# Patient Record
Sex: MALE | Race: WHITE | Employment: FULL TIME | URBAN - METROPOLITAN AREA
[De-identification: names, ages, dates, MRNs, and addresses within clinical notes are randomized per-mention and may not be internally consistent; named-entity substitution may affect disease eponyms.]

---

## 2022-08-17 ENCOUNTER — HOSPITAL ENCOUNTER (EMERGENCY)
Dept: CT IMAGING | Age: 42
Discharge: HOME | End: 2022-08-20
Payer: OTHER GOVERNMENT

## 2022-08-17 ENCOUNTER — HOSPITAL ENCOUNTER (EMERGENCY)
Dept: GENERAL RADIOLOGY | Age: 42
Discharge: HOME | End: 2022-08-20
Payer: OTHER GOVERNMENT

## 2022-08-17 ENCOUNTER — HOSPITAL ENCOUNTER (EMERGENCY)
Age: 42
Discharge: HOME | End: 2022-08-17
Attending: EMERGENCY MEDICINE
Payer: OTHER GOVERNMENT

## 2022-08-17 VITALS
HEIGHT: 69 IN | SYSTOLIC BLOOD PRESSURE: 118 MMHG | OXYGEN SATURATION: 100 % | RESPIRATION RATE: 14 BRPM | TEMPERATURE: 98.5 F | HEART RATE: 58 BPM | BODY MASS INDEX: 16.29 KG/M2 | DIASTOLIC BLOOD PRESSURE: 70 MMHG | WEIGHT: 110 LBS

## 2022-08-17 DIAGNOSIS — R51.9 NONINTRACTABLE HEADACHE, UNSPECIFIED CHRONICITY PATTERN, UNSPECIFIED HEADACHE TYPE: ICD-10-CM

## 2022-08-17 DIAGNOSIS — E86.0 DEHYDRATION: Primary | ICD-10-CM

## 2022-08-17 DIAGNOSIS — R55 SYNCOPE AND COLLAPSE: ICD-10-CM

## 2022-08-17 DIAGNOSIS — R39.11 URINARY HESITANCY: ICD-10-CM

## 2022-08-17 DIAGNOSIS — E87.6 HYPOKALEMIA: ICD-10-CM

## 2022-08-17 LAB
ALBUMIN SERPL-MCNC: 4.2 G/DL (ref 3.5–5)
ALBUMIN/GLOB SERPL: 1.2 {RATIO} (ref 1.2–3.5)
ALP SERPL-CCNC: 67 U/L (ref 50–136)
ALT SERPL-CCNC: 20 U/L (ref 12–65)
ANION GAP SERPL CALC-SCNC: 4 MMOL/L (ref 7–16)
AST SERPL-CCNC: 21 U/L (ref 15–37)
BASOPHILS # BLD: 0.1 K/UL (ref 0–0.2)
BASOPHILS NFR BLD: 1 % (ref 0–2)
BILIRUB SERPL-MCNC: 0.6 MG/DL (ref 0.2–1.1)
BUN SERPL-MCNC: 15 MG/DL (ref 6–23)
CALCIUM SERPL-MCNC: 9.7 MG/DL (ref 8.3–10.4)
CHLORIDE SERPL-SCNC: 101 MMOL/L (ref 98–107)
CO2 SERPL-SCNC: 34 MMOL/L (ref 21–32)
CREAT SERPL-MCNC: 0.74 MG/DL (ref 0.8–1.5)
DIFFERENTIAL METHOD BLD: ABNORMAL
EKG ATRIAL RATE: 90 BPM
EKG DIAGNOSIS: NORMAL
EKG P AXIS: 78 DEGREES
EKG P-R INTERVAL: 176 MS
EKG Q-T INTERVAL: 356 MS
EKG QRS DURATION: 108 MS
EKG QTC CALCULATION (BAZETT): 435 MS
EKG R AXIS: 92 DEGREES
EKG T AXIS: 71 DEGREES
EKG VENTRICULAR RATE: 90 BPM
EOSINOPHIL # BLD: 0.1 K/UL (ref 0–0.8)
EOSINOPHIL NFR BLD: 2 % (ref 0.5–7.8)
ERYTHROCYTE [DISTWIDTH] IN BLOOD BY AUTOMATED COUNT: 12.3 % (ref 11.9–14.6)
GLOBULIN SER CALC-MCNC: 3.5 G/DL (ref 2.3–3.5)
GLUCOSE SERPL-MCNC: 80 MG/DL (ref 65–100)
HCT VFR BLD AUTO: 38.9 % (ref 41.1–50.3)
HGB BLD-MCNC: 12.8 G/DL (ref 13.6–17.2)
IMM GRANULOCYTES # BLD AUTO: 0 K/UL (ref 0–0.5)
IMM GRANULOCYTES NFR BLD AUTO: 0 % (ref 0–5)
LYMPHOCYTES # BLD: 1.2 K/UL (ref 0.5–4.6)
LYMPHOCYTES NFR BLD: 26 % (ref 13–44)
MAGNESIUM SERPL-MCNC: 1.7 MG/DL (ref 1.8–2.4)
MCH RBC QN AUTO: 29.3 PG (ref 26.1–32.9)
MCHC RBC AUTO-ENTMCNC: 32.9 G/DL (ref 31.4–35)
MCV RBC AUTO: 89 FL (ref 79.6–97.8)
MONOCYTES # BLD: 0.3 K/UL (ref 0.1–1.3)
MONOCYTES NFR BLD: 6 % (ref 4–12)
NEUTS SEG # BLD: 3.2 K/UL (ref 1.7–8.2)
NEUTS SEG NFR BLD: 66 % (ref 43–78)
NRBC # BLD: 0 K/UL (ref 0–0.2)
PLATELET # BLD AUTO: 196 K/UL (ref 150–450)
PMV BLD AUTO: 10.3 FL (ref 9.4–12.3)
POTASSIUM SERPL-SCNC: 3.4 MMOL/L (ref 3.5–5.1)
PROT SERPL-MCNC: 7.7 G/DL (ref 6.3–8.2)
RBC # BLD AUTO: 4.37 M/UL (ref 4.23–5.6)
SODIUM SERPL-SCNC: 139 MMOL/L (ref 136–145)
WBC # BLD AUTO: 4.8 K/UL (ref 4.3–11.1)

## 2022-08-17 PROCEDURE — 93005 ELECTROCARDIOGRAM TRACING: CPT | Performed by: EMERGENCY MEDICINE

## 2022-08-17 PROCEDURE — 71045 X-RAY EXAM CHEST 1 VIEW: CPT

## 2022-08-17 PROCEDURE — 83735 ASSAY OF MAGNESIUM: CPT

## 2022-08-17 PROCEDURE — 85025 COMPLETE CBC W/AUTO DIFF WBC: CPT

## 2022-08-17 PROCEDURE — 99285 EMERGENCY DEPT VISIT HI MDM: CPT

## 2022-08-17 PROCEDURE — 2580000003 HC RX 258: Performed by: NURSE PRACTITIONER

## 2022-08-17 PROCEDURE — 96375 TX/PRO/DX INJ NEW DRUG ADDON: CPT

## 2022-08-17 PROCEDURE — 96374 THER/PROPH/DIAG INJ IV PUSH: CPT

## 2022-08-17 PROCEDURE — 94762 N-INVAS EAR/PLS OXIMTRY CONT: CPT

## 2022-08-17 PROCEDURE — 80053 COMPREHEN METABOLIC PANEL: CPT

## 2022-08-17 PROCEDURE — 6360000002 HC RX W HCPCS: Performed by: NURSE PRACTITIONER

## 2022-08-17 PROCEDURE — 70450 CT HEAD/BRAIN W/O DYE: CPT

## 2022-08-17 PROCEDURE — 6370000000 HC RX 637 (ALT 250 FOR IP): Performed by: NURSE PRACTITIONER

## 2022-08-17 PROCEDURE — 72125 CT NECK SPINE W/O DYE: CPT

## 2022-08-17 RX ORDER — FENTANYL 25 UG/H
1 PATCH TRANSDERMAL
COMMUNITY

## 2022-08-17 RX ORDER — ALPRAZOLAM 1 MG/1
1 TABLET ORAL 3 TIMES DAILY PRN
COMMUNITY

## 2022-08-17 RX ORDER — CETIRIZINE HYDROCHLORIDE 10 MG/1
10 TABLET ORAL DAILY
COMMUNITY

## 2022-08-17 RX ORDER — DIPHENHYDRAMINE HYDROCHLORIDE 50 MG/ML
50 INJECTION INTRAMUSCULAR; INTRAVENOUS
Status: COMPLETED | OUTPATIENT
Start: 2022-08-17 | End: 2022-08-17

## 2022-08-17 RX ORDER — OXYCODONE AND ACETAMINOPHEN 7.5; 325 MG/1; MG/1
1 TABLET ORAL EVERY 6 HOURS PRN
COMMUNITY

## 2022-08-17 RX ORDER — DULOXETIN HYDROCHLORIDE 60 MG/1
90 CAPSULE, DELAYED RELEASE ORAL DAILY
COMMUNITY

## 2022-08-17 RX ORDER — POTASSIUM CHLORIDE 1.5 G/1.77G
20 POWDER, FOR SOLUTION ORAL 2 TIMES DAILY
COMMUNITY

## 2022-08-17 RX ORDER — CYPROHEPTADINE HYDROCHLORIDE 4 MG/1
4 TABLET ORAL
COMMUNITY

## 2022-08-17 RX ORDER — LANOLIN ALCOHOL/MO/W.PET/CERES
400 CREAM (GRAM) TOPICAL
Status: COMPLETED | OUTPATIENT
Start: 2022-08-17 | End: 2022-08-17

## 2022-08-17 RX ORDER — CYCLOBENZAPRINE HCL 10 MG
10 TABLET ORAL 2 TIMES DAILY PRN
COMMUNITY

## 2022-08-17 RX ORDER — FOLIC ACID 1 MG/1
1 TABLET ORAL DAILY
COMMUNITY

## 2022-08-17 RX ORDER — 0.9 % SODIUM CHLORIDE 0.9 %
2000 INTRAVENOUS SOLUTION INTRAVENOUS ONCE
Status: COMPLETED | OUTPATIENT
Start: 2022-08-17 | End: 2022-08-17

## 2022-08-17 RX ORDER — SUMATRIPTAN 100 MG/1
100 TABLET, FILM COATED ORAL 2 TIMES DAILY
COMMUNITY

## 2022-08-17 RX ORDER — GABAPENTIN 400 MG/1
300 CAPSULE ORAL 3 TIMES DAILY
COMMUNITY

## 2022-08-17 RX ORDER — PANTOPRAZOLE SODIUM 20 MG/1
20 TABLET, DELAYED RELEASE ORAL DAILY
COMMUNITY

## 2022-08-17 RX ORDER — TRAZODONE HYDROCHLORIDE 100 MG/1
150 TABLET ORAL NIGHTLY
COMMUNITY

## 2022-08-17 RX ORDER — SILDENAFIL 100 MG/1
100 TABLET, FILM COATED ORAL DAILY
COMMUNITY

## 2022-08-17 RX ORDER — FLUDROCORTISONE ACETATE 0.1 MG/1
0.1 TABLET ORAL DAILY
COMMUNITY

## 2022-08-17 RX ORDER — DIPHENOXYLATE HYDROCHLORIDE AND ATROPINE SULFATE 2.5; .025 MG/1; MG/1
1 TABLET ORAL 4 TIMES DAILY PRN
COMMUNITY

## 2022-08-17 RX ORDER — POTASSIUM CHLORIDE 20 MEQ/1
40 TABLET, EXTENDED RELEASE ORAL 2 TIMES DAILY WITH MEALS
Status: DISCONTINUED | OUTPATIENT
Start: 2022-08-17 | End: 2022-08-17 | Stop reason: HOSPADM

## 2022-08-17 RX ORDER — KETOROLAC TROMETHAMINE 15 MG/ML
15 INJECTION, SOLUTION INTRAMUSCULAR; INTRAVENOUS
Status: COMPLETED | OUTPATIENT
Start: 2022-08-17 | End: 2022-08-17

## 2022-08-17 RX ORDER — DEXAMETHASONE SODIUM PHOSPHATE 10 MG/ML
10 INJECTION INTRAMUSCULAR; INTRAVENOUS
Status: COMPLETED | OUTPATIENT
Start: 2022-08-17 | End: 2022-08-17

## 2022-08-17 RX ORDER — DICYCLOMINE HYDROCHLORIDE 10 MG/1
10 CAPSULE ORAL
COMMUNITY

## 2022-08-17 RX ORDER — LOPERAMIDE HYDROCHLORIDE 2 MG/1
2 CAPSULE ORAL 4 TIMES DAILY PRN
COMMUNITY

## 2022-08-17 RX ADMIN — POTASSIUM CHLORIDE 40 MEQ: 1500 TABLET, EXTENDED RELEASE ORAL at 18:06

## 2022-08-17 RX ADMIN — DEXAMETHASONE SODIUM PHOSPHATE 10 MG: 10 INJECTION INTRAMUSCULAR; INTRAVENOUS at 15:23

## 2022-08-17 RX ADMIN — Medication 400 MG: at 18:06

## 2022-08-17 RX ADMIN — KETOROLAC TROMETHAMINE 15 MG: 15 INJECTION, SOLUTION INTRAMUSCULAR; INTRAVENOUS at 15:23

## 2022-08-17 RX ADMIN — SODIUM CHLORIDE 2000 ML: 900 INJECTION, SOLUTION INTRAVENOUS at 15:24

## 2022-08-17 RX ADMIN — DIPHENHYDRAMINE HYDROCHLORIDE 50 MG: 50 INJECTION INTRAMUSCULAR; INTRAVENOUS at 15:23

## 2022-08-17 ASSESSMENT — PAIN DESCRIPTION - LOCATION
LOCATION: BACK
LOCATION: HEAD

## 2022-08-17 ASSESSMENT — PAIN - FUNCTIONAL ASSESSMENT
PAIN_FUNCTIONAL_ASSESSMENT: 0-10
PAIN_FUNCTIONAL_ASSESSMENT: NONE - DENIES PAIN

## 2022-08-17 ASSESSMENT — PAIN SCALES - GENERAL: PAINLEVEL_OUTOF10: 7

## 2022-08-17 ASSESSMENT — PAIN DESCRIPTION - PAIN TYPE: TYPE: CHRONIC PAIN

## 2022-08-17 NOTE — ED PROVIDER NOTES
[facility] Emergency Department Provider Note                   PCP:                None Provider               Age: 38 y.o. Sex: male       ICD-10-CM    1. Dehydration  E86.0       2. Syncope and collapse  R55 [id number]      3. Hypokalemia  E87.6       4. Urinary hesitancy  R39.11       5. Nonintractable headache, unspecified chronicity pattern, unspecified headache type  R51.9           DISPOSITION         MDM  Number of Diagnoses or Management Options  Dehydration: new, needed workup  Hypokalemia: new, needed workup  Nonintractable headache, unspecified chronicity pattern, unspecified headache type: new, needed workup  Syncope and collapse: new, needed workup  Urinary hesitancy: new, needed workup     Amount and/or Complexity of Data Reviewed  Clinical lab tests: ordered and reviewed  Tests in the radiology section of CPT®: ordered and reviewed  Tests in the medicine section of CPT®: reviewed and ordered  Discussion of test results with the performing providers: yes  Decide to obtain previous medical records or to obtain history from someone other than the patient: yes  Obtain history from someone other than the patient: yes  Review and summarize past medical records: yes  Discuss the patient with other providers: yes  Independent visualization of images, tracings, or specimens: yes    Risk of Complications, Morbidity, and/or Mortality  Presenting problems: high  Diagnostic procedures: high  Management options: high    Patient Progress  Patient progress: improved    As in HPI. Patient is pleasant well-appearing, A&Ox4. He is a reassuring neuro exam with no focal deficits, cranial nerves grossly intact. States he has a \"mild headache. \"  Has had headache daily for the last week history of migraines. Fevers or chills. No chest pain, no visual change. States he has had similar episodes of syncope in the past.  Reports history of \"orthostatic syncope. \"  Followed with neurologist remotely. States he called the [facility] and advised to come to the ED. Patient states that he feels he may be a bit dehydrated. Suspect this may be exacerbating his orthostatic hypotension. Soft blood pressure on arrival, improved after IV fluids. Prior to giving fluids, is not quite orthostatic. Labs were reassuring, as well as UA. Patient later reported that for the last \"few years,\" he feels as if he has difficulty fully emptying his bladder, denies any new or worsening symptoms with this. No flank or CVA tenderness, no abdominal tenderness. Normoactive bowel sounds. Abdomen soft, no guarding rebound or rigidity. There is no saddle anesthesia and denies rectal sphincter laxity. No difficulty emptying his bladder in the ED, reassuring postvoid residual.  Reassuring EKG and chest x-ray, CT scan of the head and cervical spine were obtained with no acute emergent process noted. Slightly low magnesium and potassium was repleted in the ED. Discussed with ED attending, feel he stable for discharge home. Provided neurology referral, contact number to follow-up with urology advised to call next 1-2 days. Follow-up with PCP in 1 to 2 days. Given strict return precautions. Return to the ED for new, worsening, concerning symptoms. Patient is well-hydrated appearing, no distress. Nontoxic-appearing, tolerating oral intake, hemodynamically stable. All findings and plan were discussed with the patient. All questions answered. Discussed with the patient that an unremarkable evaluation in the ED does not preclude the development or presence of a serious or life threatening condition. Patient was instructed to return immediately for any worsening or change in current symptoms, or if symptoms do not continue to improve.  I instructed them to follow up with their primary care provider, own specialist, or medical provider that I am recommending for him within the next 2-3 days  The patient acknowledged understanding plan of care and affirmed approval.     Signed by: THERESE Lau     This note created using Dragon voice recognition software. Please excuse any accidental errors associated with its use, as note has not been fully proofread and edited. Orders Placed This Encounter   Procedures    XR CHEST PORTABLE    CT HEAD WO CONTRAST    CT CERVICAL SPINE WO CONTRAST    CBC with Auto Differential    Comprehensive Metabolic Panel    Magnesium    Cardiac Monitor - ED Only    Continuous Pulse Oximetry    Orthostatic blood pressure and pulse    Orthostatic blood pressure and pulse    Measure post void residual    POCT Urinalysis no Micro    EKG 12 Lead    Saline lock IV        Emelyn Gates is a [address] y.o. male who presents to the Emergency Department with chief complaint of    Chief Complaint   Patient presents with    Loss of Consciousness    Fatigue      HPI  Pleasant 51-year-old male presents to the emergency department with complaint of frequent episodes of lightheadedness and syncope. States that symptoms have been occurring daily for the last 5-7 days. States that he will feel in his normal state of health, will stand up and began walking at which point time he states he \"blacks out. \"  Reports a history of of orthostatic syncope and states he has had multiple similar episodes in years past.  States he has had intermittent headache for the last month. States that headache resolved after taking ibuprofen or Tylenol, headache is most often occurring at nighttime, but sometimes not daily. Reports history of migraines. States that his headache symptoms at this time are consistent with those which he typically experiences. He denies thunderclap onset of symptoms and reports gradual onset and increase in severity. Denies history of seizure disorder, brain CA, stroke, hypertension.   Denies injury, pain, visual change, neck or back pain, diarrhea/tightness weakness, urinary complaint, chest pain, other prodrome. Alert and oriented x4, hemodynamic stable, afebrile. Appears no acute distress, answers questions appropriately. Not toxic appearing. History of migraines, TBI, IBS, PTSD, avascular necrosis, prior colon cancer and colostomy. Denies any known cancer. Denies night sweats, recent weight loss, abdominal pain, other complaint. All other systems reviewed and are negative. Review of Systems  Constitutional: Negative for fever. Negative for appetite change, chills, diaphoresis and unexpected weight change. HENT: Negative     Eyes: Negative   Respiratory: Negative  Cardiovascular: HPI musculoskeletal: Negative   Skin: Negative     Allergic/Immunologic: Negative  Neurological: In HPI                      Past Medical History:   Diagnosis Date    Arthritis     Avascular necrosis ([facility]     Colostomy in place [facility])     COPD (chronic obstructive pulmonary disease) ([facility]     Crohn's disease (HCC)     Depression     GERD (gastroesophageal reflux disease)     Headache     IBD (inflammatory bowel disease)     IBS (irritable bowel syndrome)     PTSD (post-traumatic stress disorder)     Radiation colitis     TBI (traumatic brain injury) ([facility] 2004        History reviewed. No pertinent surgical history. History reviewed. No pertinent family history. Social History     Socioeconomic History    Marital status:      Spouse name: None    Number of children: None    Years of education: None    Highest education level: None   Tobacco Use    Smoking status: Never    Smokeless tobacco: Never   Substance and Sexual Activity    Alcohol use: Yes    Drug use: Never        Allergies: Compazine [prochlorperazine], Reglan [metoclopramide], and Zofran [ondansetron]    Previous Medications    ALPRAZOLAM (XANAX) 1 MG TABLET    Take 1 mg by mouth 3 times daily as needed for Sleep.     CETIRIZINE (ZYRTEC) 10 MG TABLET    Take 10 mg by mouth daily    CYCLOBENZAPRINE (FLEXERIL) 10 MG TABLET    Take 10 mg by mouth 2 times daily as needed for Muscle spasms    CYPROHEPTADINE (PERIACTIN) 4 MG TABLET    Take 4 mg by mouth    DICYCLOMINE (BENTYL) 10 MG CAPSULE    Take 10 mg by mouth 4 times daily (before meals and nightly)    DIPHENOXYLATE-ATROPINE (LOMOTIL) 2.5-0.025 MG PER TABLET    Take 1 tablet by mouth 4 times daily as needed for Diarrhea. DULOXETINE (CYMBALTA) 60 MG EXTENDED RELEASE CAPSULE    Take 90 mg by mouth daily    FENTANYL (DURAGESIC) 25 MCG/HR    Place 1 patch onto the skin every 72 hours. FLUDROCORTISONE (FLORINEF) 0.1 MG TABLET    Take 0.1 mg by mouth daily    FOLIC ACID (FOLVITE) 1 MG TABLET    Take 1 mg by mouth daily    GABAPENTIN (NEURONTIN) 400 MG CAPSULE    Take 300 mg by mouth 3 times daily. LOPERAMIDE (IMODIUM) 2 MG CAPSULE    Take 2 mg by mouth 4 times daily as needed for Diarrhea    OXYCODONE-ACETAMINOPHEN (PERCOCET) 7.5-325 MG PER TABLET    Take 1 tablet by mouth every 6 hours as needed for Pain. PANTOPRAZOLE (PROTONIX) 20 MG TABLET    Take 20 mg by mouth daily    POTASSIUM CHLORIDE (KLOR-CON) 20 MEQ PACKET    Take 20 mEq by mouth 2 times daily    SILDENAFIL (VIAGRA) 100 MG TABLET    Take 100 mg by mouth daily    SUMATRIPTAN (IMITREX) 100 MG TABLET    Take 100 mg by mouth in the morning and at bedtime    TRAZODONE (DESYREL) 100 MG TABLET    Take 150 mg by mouth nightly        Vitals signs and nursing note reviewed. Patient Vitals for the past 4 hrs:   Temp Pulse Resp BP SpO2   08/17/22 1354 98.5 °F (36.9 °C) 94 16 103/67 99 %          Physical Exam   Constitutional: Oriented to person, place, and time. Appears well-developed and well-nourished. No distress. HENT:    Head: Normocephalic and atraumatic   Right Ear: External ear normal.    Left Ear: External ear normal.     Nose: Nose normal.   Mouth/Throat: Mouth normal.    Eyes: Conjunctivae are normal.   Neck: Supple. No tracheal deviation. Cardiovascular: Normal rate, intact distal pulses.  Brisk capillary refill intact, less than 2 seconds. Regular rhythm present. No pitting edema. Pulmonary/Chest: Lungs are clear & equal bilaterally. No adventitious sounds. No respiratory distress. Abdominal: Soft. There is no tenderness, tension, guarding, rebound, rigidity. .    Musculoskeletal: No obvious deformity, erythema, edema. Neurological: Alert and oriented to person, place, and time. No numbness/tingling. No loss of sensation. Positive PMS ×4. GCS= 15. No focal deficits. Cranial nerves grossly intact. Skin: Skin is warm and dry. Capillary refill takes less than 2 seconds. No abrasion, no lesion, no petechiae and no rash noted. Not diaphoretic. No cyanosis, erythema, or pallor. Psychiatric: Normal mood and affect. Behavior is normal.    Nursing note and vitals reviewed. Procedures    ED EKG Interpretation  EKG was interpreted in the absence of a cardiologist.    Rate: Rate: Normal  EKG Interpretation: EKG Interpretation: sinus rhythm  ST Segments: Nonspecific ST segments - NO STEMI    Labs Reviewed   CBC WITH AUTO DIFFERENTIAL - Abnormal; Notable for the following components:       Result Value    Hemoglobin 12.8 (*)     Hematocrit 38.9 (*)     All other components within normal limits   COMPREHENSIVE METABOLIC PANEL - Abnormal; Notable for the following components:    Potassium 3.4 (*)     CO2 34 (*)     Anion Gap 4 (*)     Creatinine 0.74 (*)     All other components within normal limits   MAGNESIUM - Abnormal; Notable for the following components:    Magnesium 1.7 (*)     All other components within normal limits   POCT URINALYSIS DIPSTICK        CT HEAD WO CONTRAST   Final Result      1. No acute intracranial abnormality. 2. No evidence of acute cervical spine fracture. VOICE DICTATED BY: Dr. Yogesh Lazaro   Final Result      1. No acute intracranial abnormality. 2. No evidence of acute cervical spine fracture.       VOICE DICTATED BY: Dr. Rhea Wood          XR CHEST PORTABLE   Final Result      No evidence of acute cardiopulmonary disease. Voice dictation software was used during the making of this note. This software is not perfect and grammatical and other typographical errors may be present. This note has not been completely proofread for errors.      LALIT Guzman - CNP  08/17/22 3838

## 2022-08-17 NOTE — ED TRIAGE NOTES
Pt states he has been passing out at least 1-2 times a day for a week and headaches x1 month and nausea. Pt states he has hit his once when he passed out. Pt states he had a TBI in 2004. Pt ambulatory to triage. Pt also c/o feeling fatigue. Pt states he has lost 30lbs in six months. Pt states he has a history of colorectal cancer.

## 2022-11-02 PROBLEM — A41.9 SEPSIS (HCC): Status: ACTIVE | Noted: 2019-01-11

## 2022-11-02 PROBLEM — K56.600 PARTIAL SMALL BOWEL OBSTRUCTION (HCC): Status: ACTIVE | Noted: 2019-01-11

## 2022-11-02 PROBLEM — K52.9 IBD (INFLAMMATORY BOWEL DISEASE): Status: ACTIVE | Noted: 2019-06-05

## 2022-11-02 PROBLEM — R10.84 GENERALIZED ABDOMINAL PAIN: Status: ACTIVE | Noted: 2019-01-11

## 2022-11-02 PROBLEM — K52.9 GASTROENTERITIS: Status: ACTIVE | Noted: 2019-01-11

## 2022-11-02 PROBLEM — R11.2 NAUSEA AND VOMITING: Status: ACTIVE | Noted: 2019-01-11

## 2022-11-15 ENCOUNTER — OFFICE VISIT (OUTPATIENT)
Dept: NEUROLOGY | Age: 42
End: 2022-11-15
Payer: OTHER GOVERNMENT

## 2022-11-15 VITALS
HEIGHT: 69 IN | BODY MASS INDEX: 16.65 KG/M2 | DIASTOLIC BLOOD PRESSURE: 69 MMHG | WEIGHT: 112.4 LBS | HEART RATE: 112 BPM | OXYGEN SATURATION: 96 % | SYSTOLIC BLOOD PRESSURE: 97 MMHG

## 2022-11-15 DIAGNOSIS — G44.219 EPISODIC TENSION-TYPE HEADACHE, NOT INTRACTABLE: ICD-10-CM

## 2022-11-15 DIAGNOSIS — G43.709 CHRONIC MIGRAINE WITHOUT AURA WITHOUT STATUS MIGRAINOSUS, NOT INTRACTABLE: Primary | ICD-10-CM

## 2022-11-15 DIAGNOSIS — R41.3 MEMORY DIFFICULTY: ICD-10-CM

## 2022-11-15 PROCEDURE — 99204 OFFICE O/P NEW MOD 45 MIN: CPT | Performed by: NURSE PRACTITIONER

## 2022-11-15 ASSESSMENT — PATIENT HEALTH QUESTIONNAIRE - PHQ9
SUM OF ALL RESPONSES TO PHQ QUESTIONS 1-9: 0
SUM OF ALL RESPONSES TO PHQ9 QUESTIONS 1 & 2: 0
2. FEELING DOWN, DEPRESSED OR HOPELESS: 0
SUM OF ALL RESPONSES TO PHQ QUESTIONS 1-9: 0
1. LITTLE INTEREST OR PLEASURE IN DOING THINGS: 0
SUM OF ALL RESPONSES TO PHQ QUESTIONS 1-9: 0
SUM OF ALL RESPONSES TO PHQ QUESTIONS 1-9: 0

## 2022-11-15 ASSESSMENT — ENCOUNTER SYMPTOMS
PHOTOPHOBIA: 1
EYE DISCHARGE: 0
EYE PAIN: 0
DIARRHEA: 1
SHORTNESS OF BREATH: 1

## 2022-11-15 NOTE — PROGRESS NOTES
Patient: Lissa Coe  MRN: [id number]  : 1980    CC: Headaches    HPI:  Lissa Coe is a 38 y. o.yo male here for new patient evaluation for headaches. Referred by ED. He is here today by himself. He denies headache today. Former . Managed by [facility]. Headaches are located left side  and radiate diffusely. They began 10 years ago. The current frequency of headaches: 12 per month. Duration: 24-48 hours. Severity is 10/10. Quality of headaches are described as throbbing. Associated symptoms: photophobia, phonophobia, nausea. The headaches are exacerbated by stress, dehydration. Factors that relieve the headaches are laying down in dark room. He also endorses recurrent headache located along frontal and occipital region and radiates diffuses. Described as pressure. Stated sudden onset, comes on without warning. Duration 2-4 hours. He is denies worsening with positional changes, thunderclap, rhinorrhea, excessive tearing. No relief with Excedrin migraine, sumatriptan or tylenol. Alleviated with lying down and sleeping. Endorses poor nutritional intake with chronic lose stools. He has colostomy hx of colorectal cancer, crohns and colitis. He drinks 1 cup of coffee and 1 soda daily. Stated he mostly drinks water, ensure, and Gatorade. He stated he is planning on getting PEG tube for supplemental nutrition. He also endorses memory difficulties. Stated he has been having difficulty with short term recall. Stated he completed [address] at [facility], which he reported was otherwise normal with exception of recall. Unable to view results.      Family Members with headache history: mother and brother with migraines    Current Medications used for HA treatment: sumatriptan 100 mg,     Medications tried in the past: sumatriptan, Excedrin migraine    Associated medical problems: Colorectal cancer s/p chemo/radiation, crohns, radiation induced colitis, chemo induced neuropathy, orthostatic hypotension, COPD, TBI in 2014, PTSD, kidney stone    Previous Imaging: CT of head negative for acute intracranial abnormality; CT cervical spine negative for acute fracture or spinal stenosis. Previous Testing: CBC, CMP and mg; noted to have hypokalemia and hypomagnesia both repleted during hospitalization    He currently works part-time at [employer]. Denies extensive screen time. Denies snoring or sleep apnea, no prior history of sleep study. Denies tobacco use, ETOH use or recreational drug use. Review of Systems   Review of Systems   Constitutional: Negative. HENT: Negative. Eyes:  Positive for photophobia. Negative for pain, discharge and visual disturbance. Respiratory:  Positive for shortness of breath (chronic (hx COPD)). Cardiovascular:  Positive for palpitations. Gastrointestinal:  Positive for diarrhea (chronic). Endocrine: Negative. Genitourinary: Negative. Musculoskeletal: Negative. Skin: Negative. Allergic/Immunologic: Positive for immunocompromised state. Neurological:  Positive for headaches. Hematological: Negative. Psychiatric/Behavioral:          PTSD       Past Medical History:  Past Medical History:   Diagnosis Date    Arthritis     [facility]     Col[facility] in place [facility])     COPD (chronic obstructive pulmonary disease) ([facility]     Crohn's disease (HCC)     Depression     GERD (gastroesophageal reflux disease)     Headache     IBD (inflammatory bowel disease)     IBS (irritable bowel syndrome)     PTSD (post-traumatic stress disorder)     Radiation colitis     TBI (traumatic brain injury) 2004     History reviewed. No pertinent surgical history. History reviewed. No pertinent family history.     Current Medications:  Current Outpatient Medications   Medication Sig Dispense Refill    fluticasone-umeclidin-vilant (TRELEGY ELLIPTA) 100-62.5-25 MCG/INH AEPB       Erenumab-aooe 70 MG/ML SOAJ Inject 70 mg into the skin every 30 days 1 mL 5    traZODone (DESYREL) 100 MG tablet Take 150 mg by mouth nightly      oxyCODONE-acetaminophen (PERCOCET) 7.5-325 MG per tablet Take 1 tablet by mouth every 6 hours as needed for Pain. DULoxetine (CYMBALTA) 60 MG extended release capsule Take 90 mg by mouth daily      ALPRAZolam (XANAX) 1 MG tablet Take 1 mg by mouth 3 times daily as needed for Sleep. cyclobenzaprine (FLEXERIL) 10 MG tablet Take 10 mg by mouth 2 times daily as needed for Muscle spasms      cetirizine (ZYRTEC) 10 MG tablet Take 10 mg by mouth daily      gabapentin (NEURONTIN) 400 MG capsule Take 300 mg by mouth 3 times daily. fentaNYL (DURAGESIC) 25 MCG/HR Place 1 patch onto the skin every 72 hours. folic acid (FOLVITE) 1 MG tablet Take 1 mg by mouth daily      dicyclomine (BENTYL) 10 MG capsule Take 10 mg by mouth 4 times daily (before meals and nightly)      loperamide (IMODIUM) 2 MG capsule Take 2 mg by mouth 4 times daily as needed for Diarrhea      diphenoxylate-atropine (LOMOTIL) 2.5-0.025 MG per tablet Take 1 tablet by mouth 4 times daily as needed for Diarrhea.      sildenafil (VIAGRA) 100 MG tablet Take 100 mg by mouth daily      pantoprazole (PROTONIX) 20 MG tablet Take 20 mg by mouth daily      potassium chloride (KLOR-CON) 20 MEQ packet Take 20 mEq by mouth 2 times daily      cyproheptadine (PERIACTIN) 4 MG tablet Take 4 mg by mouth      fludrocortisone (FLORINEF) 0.1 MG tablet Take 0.1 mg by mouth daily       No current facility-administered medications for this visit. Allergies:   Allergies   Allergen Reactions    Compazine [Prochlorperazine] Hallucinations    Hydromorphone Palpitations     Other reaction(s): Unknown  Other reaction(s): Palpitations-Intolerance      Reglan [Metoclopramide] Palpitations    Valproic Acid Anxiety    Zofran [Ondansetron] Nausea And Vomiting       Social History:  Social History     Socioeconomic History    Marital status:      Spouse name: None    Number of children: None    Years of education: None    Highest education level: None   Tobacco Use    Smoking status: Never    Smokeless tobacco: Never   Vaping Use    Vaping Use: Never used   Substance and Sexual Activity    Alcohol use: Yes     Comment: once a month    Drug use: Never    Sexual activity: Yes     Partners: Female       Family History:  History reviewed. No pertinent family history. Vital Signs:  BP 97/69 (Site: Left Upper Arm, Position: Sitting, Cuff Size: Medium Adult)   Pulse (!) 112   Ht 5' 9\" (1.753 m)   Wt 112 lb 6.4 oz (51 kg)   SpO2 96%   BMI 16.60 kg/m²     Physical Exam:  General:  No acute distress. Cachetic. Head: atraumatic. normocephalic. Heart: Regular rate/rhythm; without murmur  Respiratory: non-labored respirations, without cough/audible wheeze  Neurological:  Mental Status: Alert and oriented x 3  Cranial Nerves:   I- deferred  II, III, IV, VI-Pupils equal, round and reactive to light. Extraocular movements intact. Visual fields full. V-Facial sensation intact to light touch V1-V3  VII-Face symmetric  VIII-Hearing intact to finger rub  IX-deferred  X, XII-Speech clear and fluent. Tongue midline  XI-Shoulder shrug strong and symmetric    Sensation: intact to light touch in bilateral upper and lower extremities  Strength: normal tone. Decreased bulk in BUE/LE. 5/5 strength in bilateral deltoids, biceps, triceps, interossei, hip flexors, knee extensors, knee flexors, ankle flexion and dorsiflexion. Without tremor. Cerebellar function: Finger-to-nose bilateral upper extremities intact. Negative Romberg  Reflexes: deep tendon reflexes- 2+ bilateral biceps, brachioradialis, triceps, patellar, ankle jerks. Toes: downgoing bilaterally  Gait: casual and tandem steady      Assessment/ Plan:    Ross Mcpherson was seen today for new patient, headache and memory loss.     Diagnoses and all orders for this visit:    Chronic migraine without aura without status migrainosus, not intractable  Given headache frequency is currently 12/month. Will start on migraine prevention with Aimovig 70 mg monthly injection. Medication and side effects discussed. Discontinue sumatriptan due to ineffectiveness. He is not candidate for NSAIDs due to crohns, colitis. He is not candidate to BB or CCB due to hypotension. He is not candidate for topiramate due history of kidney stones. Samples provided in office of Nurtec 75 mg ODT daily as needed for migraine abortive therapy. Ubrelvy 100 mg daily as needed for migraine abortive therapy. He has been advised not to take on same day. He will call office with update on efficacy and prescription will be sent at that time. PA cards provided. -     Erenumab-aooe 70 MG/ML SOAJ; Inject 70 mg into the skin every 30 days    Episodic tension-type headache, not intractable    Memory difficulty  Multifactorial etiology. Will obtain MRI of brain to rule out organic etiology such as stroke or side effects from chemotherapy. Will obtain labs to rule out metabolic or infectious etiologies. -     MRI BRAIN W WO CONTRAST; Future  -     CBC with Auto Differential; Future  -     Comprehensive Metabolic Panel; Future  -     TSH; Future  -     Testosterone Total Only, Male; Future  -     Folate; Future  -     Vitamin B12; Future  -     Vitamin B1, Whole Blood; Future  -     Magnesium; Future    Headache Education:   Instructed the patient on over-the-counter headache management medications including magnesium and butterbur. To avoid a pain medication overuse headache trying not to take pain medicines more than 3 doses a week. To help relieve headache symptoms without taking pain medicine lie down under darkroom and drink glass of water. Consider lifestyle modification including good sleep hygiene, routine medial schedules, regular exercise and managing triggers. Keep a headache diary  to reveal triggers and possible patterns.   Triggers may be: Food, stress, perfumes, alcohol, or even chocolate. Drink plenty of water and try to get 8 hours of sleep each night to reduce risk factors that may cause headaches. Follow up in 8 weeks or sooner if needed. I spent greater than 50% of the 60 total minutes of today's visit in coordination of care and patient/family education and counseling regarding the above patient concerns, reviewing the patient's medical record, my assessment and recommendations.         Luz Elena Maldonado NP  [facility]  [facility], [address] [place] [address]  Phone: [phone]

## 2023-01-10 ENCOUNTER — TELEPHONE (OUTPATIENT)
Dept: NEUROLOGY | Age: 43
End: 2023-01-10

## 2023-05-08 DIAGNOSIS — G43.709 CHRONIC MIGRAINE WITHOUT AURA WITHOUT STATUS MIGRAINOSUS, NOT INTRACTABLE: ICD-10-CM

## 2023-05-08 RX ORDER — ERENUMAB-AOOE 70 MG/ML
INJECTION SUBCUTANEOUS
Qty: 1 ML | Refills: 5 | OUTPATIENT
Start: 2023-05-08